# Patient Record
Sex: MALE | Race: WHITE | NOT HISPANIC OR LATINO | Employment: FULL TIME | URBAN - NONMETROPOLITAN AREA
[De-identification: names, ages, dates, MRNs, and addresses within clinical notes are randomized per-mention and may not be internally consistent; named-entity substitution may affect disease eponyms.]

---

## 2022-07-22 ENCOUNTER — HOSPITAL ENCOUNTER (EMERGENCY)
Facility: OTHER | Age: 22
Discharge: HOME | End: 2022-07-22
Attending: PHYSICIAN ASSISTANT | Admitting: PHYSICIAN ASSISTANT
Payer: COMMERCIAL

## 2022-07-22 VITALS
SYSTOLIC BLOOD PRESSURE: 119 MMHG | RESPIRATION RATE: 16 BRPM | OXYGEN SATURATION: 99 % | BODY MASS INDEX: 22.19 KG/M2 | HEART RATE: 75 BPM | HEIGHT: 70 IN | WEIGHT: 155 LBS | DIASTOLIC BLOOD PRESSURE: 62 MMHG | TEMPERATURE: 98.1 F

## 2022-07-22 DIAGNOSIS — S69.92XA FISH HOOK INJURY OF FINGER OF LEFT HAND, INITIAL ENCOUNTER: ICD-10-CM

## 2022-07-22 DIAGNOSIS — S61.239A PUNCTURE WOUND OF FINGER OF LEFT HAND, INITIAL ENCOUNTER: ICD-10-CM

## 2022-07-22 PROBLEM — Z86.19 HISTORY OF CHICKEN POX: Status: ACTIVE | Noted: 2022-07-22

## 2022-07-22 PROCEDURE — 250N000013 HC RX MED GY IP 250 OP 250 PS 637: Performed by: PHYSICIAN ASSISTANT

## 2022-07-22 PROCEDURE — 90715 TDAP VACCINE 7 YRS/> IM: CPT | Performed by: PHYSICIAN ASSISTANT

## 2022-07-22 PROCEDURE — 90471 IMMUNIZATION ADMIN: CPT | Performed by: PHYSICIAN ASSISTANT

## 2022-07-22 PROCEDURE — 99283 EMERGENCY DEPT VISIT LOW MDM: CPT | Mod: 25 | Performed by: PHYSICIAN ASSISTANT

## 2022-07-22 PROCEDURE — 99283 EMERGENCY DEPT VISIT LOW MDM: CPT | Performed by: PHYSICIAN ASSISTANT

## 2022-07-22 PROCEDURE — 250N000011 HC RX IP 250 OP 636: Performed by: PHYSICIAN ASSISTANT

## 2022-07-22 PROCEDURE — 250N000009 HC RX 250: Performed by: PHYSICIAN ASSISTANT

## 2022-07-22 RX ORDER — NAPROXEN 500 MG/1
500 TABLET ORAL
COMMUNITY
Start: 2021-05-11

## 2022-07-22 RX ADMIN — TETANUS TOXOID, REDUCED DIPHTHERIA TOXOID AND ACELLULAR PERTUSSIS VACCINE, ADSORBED 0.5 ML: 5; 2.5; 8; 8; 2.5 SUSPENSION INTRAMUSCULAR at 21:56

## 2022-07-22 RX ADMIN — LIDOCAINE HYDROCHLORIDE 5 ML: 10 INJECTION, SOLUTION EPIDURAL; INFILTRATION; INTRACAUDAL; PERINEURAL at 21:54

## 2022-07-22 RX ADMIN — AMOXICILLIN AND CLAVULANATE POTASSIUM 1 TABLET: 875; 125 TABLET, FILM COATED ORAL at 21:58

## 2022-07-23 NOTE — ED TRIAGE NOTES
Pt has fishhook in left hand.     Triage Assessment     Row Name 07/22/22 1948       Triage Assessment (Adult)    Airway WDL WDL       Respiratory WDL    Respiratory WDL WDL       Skin Circulation/Temperature WDL    Skin Circulation/Temperature WDL WDL       Cardiac WDL    Cardiac WDL WDL       Peripheral/Neurovascular WDL    Peripheral Neurovascular WDL WDL       Cognitive/Neuro/Behavioral WDL    Cognitive/Neuro/Behavioral WDL WDL

## 2022-07-23 NOTE — ED PROVIDER NOTES
"  History     Chief Complaint   Patient presents with     Foreign Body in Skin     HPI  Fareed Fregoso is a 21 year old male who was fishing today when a trouble hook from a wrap to the lower had 1 hook that became embedded in his left distal small finger.  Denies any other injuries.  He is unsure when his last tetanus was.    Allergies:  Not on File    Problem List:    Patient Active Problem List    Diagnosis Date Noted     History of chicken pox 07/22/2022     Priority: Medium     Formatting of this note might be different from the original.  2000       Displacement of lumbar intervertebral disc 02/19/2016     Priority: Medium     Formatting of this note might be different from the original.  Under evaluation.       Contact dermatitis and eczema 10/15/2011     Priority: Medium        Past Medical History:    No past medical history on file.    Past Surgical History:    No past surgical history on file.    Family History:    No family history on file.    Social History:  Marital Status:  Single [1]        Medications:    amoxicillin-clavulanate (AUGMENTIN) 875-125 MG tablet  naproxen (NAPROSYN) 500 MG tablet          Review of Systems    Physical Exam   BP: 119/62  Pulse: 75  Temp: 98.1  F (36.7  C)  Resp: 16  Height: 177.8 cm (5' 10\")  Weight: 70.3 kg (155 lb)  SpO2: 99 %      Physical Exam    ED Course                 [facility]    Foreign Body Removal    Date/Time: 7/22/2022 9:56 PM  Performed by: Rashawn Ross PA-C  Authorized by: Rashawn Ross PA-C     Risks, benefits and alternatives discussed.      UNIVERSAL PROTOCOL   Site Marked: NA  Prior Images Obtained and Reviewed:  NA  Required items: Required blood products, implants, devices and special equipment available    Patient identity confirmed:  Verbally with patient and arm band  NA - No sedation, light sedation, or local anesthesia  Confirmation Checklist:  Patient's identity using two indicators, relevant allergies and " procedure was appropriate and matched the consent or emergent situation  Universal Protocol: the Joint Commission Universal Protocol was followed    Preparation: Patient was prepped and draped in usual sterile fashion      LOCATION     Location:  Finger    Finger location:  L little finger    Depth:  Subcutaneous    Tendon involvement:  None      PRE-PROCEDURE DETAILS     Imaging:  None    Neurovascular status: intact    ANESTHESIA (see MAR for exact dosages)     Anesthesia method:  Local infiltration    Local anesthetic:  Lidocaine 1% w/o epi  PROCEDURE TYPE     Procedure complexity:  Simple      PROCEDURE DETAILS     Localization method:  Visualized    Dissection of underlying tissues: no      Bloodless field: yes      Removal mechanism:  Forceps    Foreign bodies recovered:  1    Description:  [place]    Intact foreign body removal: no (Argelia was cut to remove patient)    POST-PROCEDURE DETAILS     Patient tolerance of procedure:  Patient tolerated the procedure well with no immediate complications        PROCEDURE  Describe Procedure: After verbal consent.  1 cc of 1% lidocaine was injected in the finger tip area.  After proper anesthetic effect was achieved, the fishhook was advanced through the skin and the distal argelia was cut off.  The remainder of the hook was then removed proximally.  No complications from this procedure.  He was bandaged with a Band-Aid.  Patient Tolerance:  Patient tolerated the procedure well with no immediate complications      No results found for this or any previous visit (from the past 24 hour(s)).    Medications   Tdap (tetanus-diptheria-acell pertussis) (BOOSTRIX) injection ANGEL 0.5 mL (has no administration in time range)   amoxicillin-clavulanate (AUGMENTIN) 875-125 MG per tablet 1 tablet (has no administration in time range)   lidocaine 1 % 5 mL (5 mLs Infiltration Given 7/22/22 8056)       Assessments & Plan (with Medical Decision Making)     I have reviewed the nursing  notes.    I have reviewed the findings, diagnosis, plan and need for follow up with the patient.      New Prescriptions    AMOXICILLIN-CLAVULANATE (AUGMENTIN) 875-125 MG TABLET    Take 1 tablet by mouth 2 times daily       Final diagnoses:   Fish hook injury of finger of left hand, initial encounter   Puncture wound of finger of left hand, initial encounter - small finger     Afebrile.  Vital signs stable.  Patient with a fishhook injury to his distal left small finger.  This was removed as above.  A tetanus booster was given.  He was given an oral Augmentin as well.  Rx for 10-day course of Augmentin.  He can use Tylenol Motrin for pain relief.  I discussed ice as well.  He is from the [facility] and can follow-up with his provider if there is any signs of infection.  Follow-up sooner if there is any other concerns problems or questions.  I explained my diagnostic considerations and recommendations and the patient voiced an understanding and was in agreement with the treatment plan. All questions were answered to the best of my ability.  We discussed potential side effects of any prescribed or recommended therapies, as well as expectations for response to treatments.    7/22/2022   [facility]     Rashwan Ross PA-C  07/22/22 6482

## (undated) RX ORDER — LIDOCAINE HYDROCHLORIDE 10 MG/ML
INJECTION, SOLUTION EPIDURAL; INFILTRATION; INTRACAUDAL; PERINEURAL
Status: DISPENSED
Start: 2022-07-22